# Patient Record
Sex: FEMALE | Race: WHITE
[De-identification: names, ages, dates, MRNs, and addresses within clinical notes are randomized per-mention and may not be internally consistent; named-entity substitution may affect disease eponyms.]

---

## 2019-02-20 ENCOUNTER — HOSPITAL ENCOUNTER (EMERGENCY)
Dept: HOSPITAL 74 - JER | Age: 24
Discharge: HOME | End: 2019-02-20
Payer: COMMERCIAL

## 2019-02-20 VITALS — HEART RATE: 80 BPM | TEMPERATURE: 98.1 F | DIASTOLIC BLOOD PRESSURE: 74 MMHG | SYSTOLIC BLOOD PRESSURE: 121 MMHG

## 2019-02-20 VITALS — BODY MASS INDEX: 23.9 KG/M2

## 2019-02-20 DIAGNOSIS — H60.312: ICD-10-CM

## 2019-02-20 DIAGNOSIS — J02.0: Primary | ICD-10-CM

## 2019-02-20 RX ADMIN — DEXAMETHASONE ONE MG: 4 TABLET ORAL at 03:30

## 2019-02-20 RX ADMIN — OFLOXACIN ONE: 3 SOLUTION AURICULAR (OTIC) at 03:30

## 2019-02-20 RX ADMIN — OFLOXACIN ONE DROP: 3 SOLUTION AURICULAR (OTIC) at 03:01

## 2019-02-20 RX ADMIN — DEXAMETHASONE ONE MG: 4 TABLET ORAL at 03:28

## 2019-02-20 NOTE — PDOC
*Physical Exam





- Vital Signs


 Last Vital Signs











Temp Pulse Resp BP Pulse Ox


 


 97.8 F   89   18   126/76   100 


 


 02/20/19 01:03  02/20/19 01:03  02/20/19 01:03  02/20/19 01:03  02/20/19 01:03














Medical Decision Making





- Medical Decision Making





02/20/19 01:47


Patient seen by the advanced practice provider under my direct supervision. 

Ancillary testing reviewed as necessary.


I agree with plan as outlined by the advanced practice provider.





*DC/Admit/Observation/Transfer


Diagnosis at time of Disposition: 


Pharyngitis


Qualifiers:


 Pharyngitis/tonsillitis etiology: streptococcus Qualified Code(s): J02.0 - 

Streptococcal pharyngitis





Otitis externa


Qualifiers:


 Otitis externa type: diffuse Chronicity: acute Laterality: left Qualified Code(

s): H60.312 - Diffuse otitis externa, left ear








- Discharge Dispostion


Disposition: HOME





- Prescriptions


Prescriptions: 


Ibuprofen 400 mg PO QID PRN #14 tablet


 PRN Reason: Pain


Ofloxacin Otic [Floxin Otic -] 10 drop OT BID #1 bottle





- Referrals





- Patient Instructions


Printed Discharge Instructions:  DI for Strep Throat


Additional Instructions: 


Gargle with warm salty water


Take ibuprofen every 6 hours as needed for pain.


use ofloxacin twice daily to left ear


Return immediately to the emergency room if symptoms worsen.





- Post Discharge Activity


Forms/Work/School Notes:  Back to Work

## 2019-02-20 NOTE — PDOC
History of Present Illness





- General


Chief Complaint: Ear Problem


Stated Complaint: EARACHE


Time Seen by Provider: 02/20/19 01:44


History Source: Patient





- History of Present Illness


Initial Comments: 





02/20/19 02:53


23-year-old female complaining of left ear pain, chills, fever, throat pain for 

3 days. Patient reports that the ear pain is worse despite Tylenol every 4 

hours and ibuprofen every 6 hours at home. Denies drooling, hot potato voice or 

unilateral throat swelling.





Past History





- Past Medical History


Allergies/Adverse Reactions: 


 Allergies











Allergy/AdvReac Type Severity Reaction Status Date / Time


 


No Known Allergies Allergy   Verified 02/20/19 01:56











Home Medications: 


Ambulatory Orders





Ibuprofen 400 mg PO QID PRN #14 tablet 02/20/19 


Ofloxacin Otic [Floxin Otic -] 10 drop OT BID #1 bottle 02/20/19 











- Suicide/Smoking/Psychosocial Hx


Smoking History: Unknown if ever smoked





**Review of Systems





- Review of Systems


Able to Perform ROS?: Yes


Is the patient limited English proficient: No


Constitutional: Yes: Fever


HEENTM: Yes: Ear Pain, Nose Congestion, Throat Pain


Respiratory: No: Symptoms reported, See HPI, Cough, Orthopnea, Shortness of 

Breath, SOB with Exertion, SOB at Rest, Stridor, Wheezing, Productive cough, 

Hemoptysis, Other


Cardiac (ROS): No: Symptoms Reported, See HPI, Chest Pain, Edema, Irregular 

Heart Rate, Lightheadedness, Palpitations, Syncope, Chest Tightness, Other





*Physical Exam





- Vital Signs


 Last Vital Signs











Temp Pulse Resp BP Pulse Ox


 


 97.8 F   89   18   126/76   100 


 


 02/20/19 01:03  02/20/19 01:03  02/20/19 01:03  02/20/19 01:03  02/20/19 01:03














- Physical Exam


General Appearance: Yes: Appropriately Dressed


HEENT: positive: Tonsillar Exudate, Tonsillar Erythema, Other (No unilateral 

swelling. Gross pharyngeal erythema. Positive cervical lymphadenopathy. Left TM 

dull with otitis externa.)


Neck: positive: Other (no drooling)


Respiratory/Chest: positive: Lungs Clear, Normal Breath Sounds


Extremity: positive: Normal Capillary Refill, Normal Inspection, Normal Range 

of Motion





Moderate Sedation





- Procedure Monitoring


Vital Signs: 


Procedure Monitoring Vital Signs











Temperature  97.8 F   02/20/19 01:03


 


Pulse Rate  89   02/20/19 01:03


 


Respiratory Rate  18   02/20/19 01:03


 


Blood Pressure  126/76   02/20/19 01:03


 


O2 Sat by Pulse Oximetry (%)  100   02/20/19 01:03











*DC/Admit/Observation/Transfer


Diagnosis at time of Disposition: 


Pharyngitis


Qualifiers:


 Pharyngitis/tonsillitis etiology: streptococcus Qualified Code(s): J02.0 - 

Streptococcal pharyngitis





Otitis externa


Qualifiers:


 Otitis externa type: diffuse Chronicity: acute Laterality: left Qualified Code(

s): H60.312 - Diffuse otitis externa, left ear








- Discharge Dispostion


Disposition: HOME





- Prescriptions


Prescriptions: 


Ibuprofen 400 mg PO QID PRN #14 tablet


 PRN Reason: Pain


Ofloxacin Otic [Floxin Otic -] 10 drop OT BID #1 bottle





- Referrals





- Patient Instructions


Printed Discharge Instructions:  DI for Strep Throat


Additional Instructions: 


Gargle with warm salty water


Take ibuprofen every 6 hours as needed for pain.


use ofloxacin twice daily to left ear


Return immediately to the emergency room if symptoms worsen.





- Post Discharge Activity


Forms/Work/School Notes:  Back to Work

## 2021-11-04 ENCOUNTER — APPOINTMENT (OUTPATIENT)
Dept: OBGYN | Facility: CLINIC | Age: 26
End: 2021-11-04
Payer: COMMERCIAL

## 2021-11-04 ENCOUNTER — NON-APPOINTMENT (OUTPATIENT)
Age: 26
End: 2021-11-04

## 2021-11-04 VITALS — WEIGHT: 120 LBS | BODY MASS INDEX: 21.26 KG/M2 | HEIGHT: 63 IN

## 2021-11-04 VITALS
HEIGHT: 63 IN | WEIGHT: 110 LBS | SYSTOLIC BLOOD PRESSURE: 100 MMHG | BODY MASS INDEX: 19.49 KG/M2 | DIASTOLIC BLOOD PRESSURE: 60 MMHG

## 2021-11-04 DIAGNOSIS — Z78.9 OTHER SPECIFIED HEALTH STATUS: ICD-10-CM

## 2021-11-04 PROCEDURE — 99202 OFFICE O/P NEW SF 15 MIN: CPT

## 2021-11-05 LAB
ALBUMIN SERPL ELPH-MCNC: 4.7 G/DL
ALP BLD-CCNC: 62 U/L
ALT SERPL-CCNC: 10 U/L
ANION GAP SERPL CALC-SCNC: 17 MMOL/L
AST SERPL-CCNC: 12 U/L
BASOPHILS # BLD AUTO: 0.05 K/UL
BASOPHILS NFR BLD AUTO: 0.8 %
BILIRUB SERPL-MCNC: 0.3 MG/DL
BUN SERPL-MCNC: 14 MG/DL
CALCIUM SERPL-MCNC: 9.4 MG/DL
CHLORIDE SERPL-SCNC: 103 MMOL/L
CO2 SERPL-SCNC: 22 MMOL/L
CREAT SERPL-MCNC: 0.72 MG/DL
EOSINOPHIL # BLD AUTO: 0.05 K/UL
EOSINOPHIL NFR BLD AUTO: 0.8 %
FSH SERPL-MCNC: 9.1 IU/L
GLUCOSE SERPL-MCNC: 99 MG/DL
HCG SERPL-MCNC: <1 MIU/ML
HCT VFR BLD CALC: 41.2 %
HGB BLD-MCNC: 13.5 G/DL
IMM GRANULOCYTES NFR BLD AUTO: 0.2 %
LH SERPL-ACNC: 25.4 IU/L
LYMPHOCYTES # BLD AUTO: 1.87 K/UL
LYMPHOCYTES NFR BLD AUTO: 30.5 %
MAN DIFF?: NORMAL
MCHC RBC-ENTMCNC: 32.5 PG
MCHC RBC-ENTMCNC: 32.8 GM/DL
MCV RBC AUTO: 99 FL
MONOCYTES # BLD AUTO: 0.51 K/UL
MONOCYTES NFR BLD AUTO: 8.3 %
NEUTROPHILS # BLD AUTO: 3.65 K/UL
NEUTROPHILS NFR BLD AUTO: 59.4 %
PLATELET # BLD AUTO: 234 K/UL
POTASSIUM SERPL-SCNC: 4.3 MMOL/L
PROLACTIN SERPL-MCNC: 12.4 NG/ML
PROT SERPL-MCNC: 6.9 G/DL
RBC # BLD: 4.16 M/UL
RBC # FLD: 12.4 %
SODIUM SERPL-SCNC: 141 MMOL/L
TESTOST SERPL-MCNC: 14.2 NG/DL
TSH SERPL-ACNC: 1.92 UIU/ML
WBC # FLD AUTO: 6.14 K/UL

## 2021-11-12 ENCOUNTER — RESULT REVIEW (OUTPATIENT)
Age: 26
End: 2021-11-12

## 2021-11-12 ENCOUNTER — APPOINTMENT (OUTPATIENT)
Dept: OBGYN | Facility: CLINIC | Age: 26
End: 2021-11-12

## 2021-11-15 LAB
17OHP SERPL-MCNC: 56 NG/DL
ANTI-MUELLERIAN HORMONE: 7.19 NG/ML

## 2021-11-18 ENCOUNTER — APPOINTMENT (OUTPATIENT)
Dept: OBGYN | Facility: CLINIC | Age: 26
End: 2021-11-18
Payer: COMMERCIAL

## 2021-11-18 VITALS
WEIGHT: 115 LBS | DIASTOLIC BLOOD PRESSURE: 70 MMHG | SYSTOLIC BLOOD PRESSURE: 115 MMHG | HEIGHT: 63 IN | BODY MASS INDEX: 20.38 KG/M2

## 2021-11-18 DIAGNOSIS — N91.5 OLIGOMENORRHEA, UNSPECIFIED: ICD-10-CM

## 2021-11-18 DIAGNOSIS — Z00.00 ENCOUNTER FOR GENERAL ADULT MEDICAL EXAMINATION W/OUT ABNORMAL FINDINGS: ICD-10-CM

## 2021-11-18 DIAGNOSIS — Z01.419 ENCOUNTER FOR GYNECOLOGICAL EXAMINATION (GENERAL) (ROUTINE) W/OUT ABNORMAL FINDINGS: ICD-10-CM

## 2021-11-18 DIAGNOSIS — Z11.3 ENCOUNTER FOR SCREENING FOR INFECTIONS WITH A PREDOMINANTLY SEXUAL MODE OF TRANSMISSION: ICD-10-CM

## 2021-11-18 PROCEDURE — 99395 PREV VISIT EST AGE 18-39: CPT

## 2021-11-19 PROBLEM — N91.5 OLIGOMENORRHEA: Status: ACTIVE | Noted: 2021-11-04

## 2021-11-19 PROBLEM — Z01.419 ENCOUNTER FOR ANNUAL ROUTINE GYNECOLOGICAL EXAMINATION: Status: RESOLVED | Noted: 2021-11-18 | Resolved: 2021-12-02

## 2021-11-19 PROBLEM — Z11.3 SCREEN FOR STD (SEXUALLY TRANSMITTED DISEASE): Status: ACTIVE | Noted: 2021-11-18

## 2021-11-19 LAB
C TRACH RRNA SPEC QL NAA+PROBE: NOT DETECTED
N GONORRHOEA RRNA SPEC QL NAA+PROBE: NOT DETECTED
SOURCE TP AMPLIFICATION: NORMAL

## 2021-11-19 RX ORDER — NORETHINDRONE 0.35 MG/1
0.35 TABLET ORAL
Refills: 0 | Status: ACTIVE | COMMUNITY

## 2021-11-24 LAB — CYTOLOGY CVX/VAG DOC THIN PREP: ABNORMAL

## 2021-11-30 ENCOUNTER — TRANSCRIPTION ENCOUNTER (OUTPATIENT)
Age: 26
End: 2021-11-30

## 2022-03-15 ENCOUNTER — TRANSCRIPTION ENCOUNTER (OUTPATIENT)
Age: 27
End: 2022-03-15

## 2022-09-06 ENCOUNTER — NON-APPOINTMENT (OUTPATIENT)
Age: 27
End: 2022-09-06

## 2023-02-01 ENCOUNTER — NON-APPOINTMENT (OUTPATIENT)
Age: 28
End: 2023-02-01

## 2023-02-11 ENCOUNTER — NON-APPOINTMENT (OUTPATIENT)
Age: 28
End: 2023-02-11